# Patient Record
Sex: MALE | Race: WHITE | NOT HISPANIC OR LATINO
[De-identification: names, ages, dates, MRNs, and addresses within clinical notes are randomized per-mention and may not be internally consistent; named-entity substitution may affect disease eponyms.]

---

## 2018-12-07 PROBLEM — Z00.00 ENCOUNTER FOR PREVENTIVE HEALTH EXAMINATION: Status: ACTIVE | Noted: 2018-12-07

## 2018-12-12 ENCOUNTER — RX RENEWAL (OUTPATIENT)
Age: 63
End: 2018-12-12

## 2018-12-13 ENCOUNTER — RX RENEWAL (OUTPATIENT)
Age: 63
End: 2018-12-13

## 2019-10-02 ENCOUNTER — RECORD ABSTRACTING (OUTPATIENT)
Age: 64
End: 2019-10-02

## 2019-10-02 DIAGNOSIS — Z87.891 PERSONAL HISTORY OF NICOTINE DEPENDENCE: ICD-10-CM

## 2019-10-02 DIAGNOSIS — K52.9 NONINFECTIVE GASTROENTERITIS AND COLITIS, UNSPECIFIED: ICD-10-CM

## 2019-10-02 DIAGNOSIS — Z72.89 OTHER PROBLEMS RELATED TO LIFESTYLE: ICD-10-CM

## 2019-10-02 DIAGNOSIS — K62.5 HEMORRHAGE OF ANUS AND RECTUM: ICD-10-CM

## 2019-10-02 DIAGNOSIS — Z82.49 FAMILY HISTORY OF ISCHEMIC HEART DISEASE AND OTHER DISEASES OF THE CIRCULATORY SYSTEM: ICD-10-CM

## 2019-10-02 DIAGNOSIS — Z80.3 FAMILY HISTORY OF MALIGNANT NEOPLASM OF BREAST: ICD-10-CM

## 2019-10-02 DIAGNOSIS — Z98.890 OTHER SPECIFIED POSTPROCEDURAL STATES: ICD-10-CM

## 2019-10-02 RX ORDER — OMEGA-3/DHA/EPA/FISH OIL 300-1000MG
1000 CAPSULE ORAL
Refills: 0 | Status: ACTIVE | COMMUNITY

## 2019-10-02 RX ORDER — FLUTICASONE PROPIONATE 50 UG/1
SPRAY, METERED NASAL
Refills: 0 | Status: ACTIVE | COMMUNITY

## 2019-10-02 RX ORDER — AZELASTINE HYDROCHLORIDE 137 UG/1
137 SPRAY, METERED NASAL
Refills: 0 | Status: ACTIVE | COMMUNITY

## 2019-11-05 ENCOUNTER — APPOINTMENT (OUTPATIENT)
Dept: GASTROENTEROLOGY | Facility: CLINIC | Age: 64
End: 2019-11-05
Payer: MEDICARE

## 2019-11-05 VITALS
WEIGHT: 170 LBS | HEART RATE: 64 BPM | SYSTOLIC BLOOD PRESSURE: 128 MMHG | DIASTOLIC BLOOD PRESSURE: 76 MMHG | OXYGEN SATURATION: 97 % | HEIGHT: 70 IN | BODY MASS INDEX: 24.34 KG/M2

## 2019-11-05 PROCEDURE — 99214 OFFICE O/P EST MOD 30 MIN: CPT

## 2019-11-05 RX ORDER — OMEPRAZOLE 40 MG/1
40 CAPSULE, DELAYED RELEASE ORAL
Refills: 0 | Status: DISCONTINUED | COMMUNITY
End: 2019-11-05

## 2019-11-05 NOTE — HISTORY OF PRESENT ILLNESS
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\par with signif inflam in 2015..\par \par patients symptoms began at the age of about [age].\par \par symptoms began with rectal bleeding, approx march 2013.\par \par saw several other gis, I started following him in 2014..\par \par last colonoscopy;\par \par was in 2016, and patient was in remission, but there was a solitary granuloma on the cecal biopsies, otherwise the biopsies being unremarkable\par \par meanwhile, only time the patient received prednisone was early on in the process, and as he recalls, the prednisone was poorly tolerated at least at the higher doses.\par \par currently, patient has been on three balsalazide po bid..\par \par and he has whittled down his ROWAAS enemas to approx two per month.\par \par no sx, no diarrhea, no bleeding, feels very well

## 2019-11-05 NOTE — ASSESSMENT
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 Smoking history,none since about 20 years ago\par 4 Last colonoscopy somewhat over 3 years ago and the patient was in remission except for a granuloma of the cecum\par 5. I do feel that the patient can't even reduce his dose of   balsalazide to two x 750 mg, bid\par 6.  patient should not reduce his dose of rowasa enemas further, as i like this form of topical maintenance therapy\par 7.  had recent labs, and i would like to see his hg and hct [said to be low..or on the low side]\par 8.  no upper gi sx\par 9.  i recomm  a ua yearly..and this was done also\par 10.  flu shot has been obtained\par 11.  has had pneumonia...did he have two pneumonia, the second being the prevnar.\par 12.  here is also a shingles shot, and I recommend the shinrix be received\par \par \par meanwhile time for colonoscopy..\par \par it has been somewhat more than three years..\par this is for colon cancer surveillance, and with chronic ulcerative colitis, we need to do more frequent colonoscopy surveillance..\par \par there is no proof that being in remission can alleviate the need for follow up colonoscopies, although it is my intuition that good clinical control of ulcerative colitis does help protect against colon cancer\par \par AS WE OBTAIN INFORMED CONSENT FOR COLONOSCOPY, UPPER ENDOSCOPY [EGD], OR BOTH PROCEDURES TOGETHER;\par \par As with all procedures, there are risks of which the patient should be aware\par \par 1.  Anesthesia; deep sedation with Propofol;  there is a small risk of aspiration and pulmonary infection.  The Anesthesiologist meets with the patient the morning of the procedure to discuss in more detail\par \par 2.  risk of bleeding; from removal of a polyp, or rarely, from biopsies, 1 % or less\par \par 3.  risk of injury or perforation of the colon or upper GI tract; one in a thousand or less,  from removing a polyp or from advancing the instrument\par \par \par More than 50% of the face to face time was devoted to counseling and /or coordination of care.  THis coordination of care may have included reviewing other medical notes and reports, and communicating with other health professionals\par

## 2020-01-30 ENCOUNTER — APPOINTMENT (OUTPATIENT)
Dept: GASTROENTEROLOGY | Facility: HOSPITAL | Age: 65
End: 2020-01-30

## 2020-02-05 ENCOUNTER — RESULT REVIEW (OUTPATIENT)
Age: 65
End: 2020-02-05

## 2020-02-06 ENCOUNTER — APPOINTMENT (OUTPATIENT)
Dept: GASTROENTEROLOGY | Facility: HOSPITAL | Age: 65
End: 2020-02-06

## 2020-02-20 ENCOUNTER — RX RENEWAL (OUTPATIENT)
Age: 65
End: 2020-02-20

## 2020-06-19 DIAGNOSIS — K51.30 ULCERATIVE (CHRONIC) RECTOSIGMOIDITIS W/OUT COMPLICATIONS: ICD-10-CM

## 2020-10-17 ENCOUNTER — TRANSCRIPTION ENCOUNTER (OUTPATIENT)
Age: 65
End: 2020-10-17

## 2020-11-23 ENCOUNTER — APPOINTMENT (OUTPATIENT)
Dept: GASTROENTEROLOGY | Facility: CLINIC | Age: 65
End: 2020-11-23
Payer: MEDICARE

## 2020-11-23 VITALS
OXYGEN SATURATION: 98 % | SYSTOLIC BLOOD PRESSURE: 130 MMHG | TEMPERATURE: 96.7 F | DIASTOLIC BLOOD PRESSURE: 90 MMHG | BODY MASS INDEX: 28.88 KG/M2 | WEIGHT: 195 LBS | HEIGHT: 69 IN | HEART RATE: 68 BPM

## 2020-11-23 DIAGNOSIS — K51.518: ICD-10-CM

## 2020-11-23 PROCEDURE — 99214 OFFICE O/P EST MOD 30 MIN: CPT

## 2020-11-23 NOTE — HISTORY OF PRESENT ILLNESS
[FreeTextEntry1] : Erika is doing well\par \par Stays in remission\par no active symptoms\par had his last colonoscopy in feb 2020, and he was in remission at the time\par \par so this is what we would refer to as deep remission\par \par patient is very pleased that he can stay on COlazal 750 mg x2, bid and maintain his remission\par \par he has virtually tapered himself off rowasa enemas, down to approx one monthly

## 2020-11-23 NOTE — ASSESSMENT
[FreeTextEntry1] : 1.  Chronic Left SIded Ulcerative Colitis\par \par has stayed in remission since colonoscopy, and up to as well\par \par plan\par \par 1.  continue colonoscopies every two years\par \par 2.  stay on Colazal 750 mg x2 po bid\par \par 3.  patient is doing a good job on his medical fu and immunizations for various medical issues; pneumonia, Herpes ZOster, FLu, etc\par \par 4.  I have emphasized that his Ulcerative COlitis puts him in a higher risk category, for developing COvid complications, complicated course\par so it is extremely important for patient to get the Vaccine for Covid as soon as it is availabe to an older patient with significant co morbidity\par \par 5.i advise patients on mesalamine drugs, such as Colazal\par to have BUN and Creatinine and urine analysis every six months, because of a small chance of Allergic Nephritis..\par \par More than 50% of the face to face time was devoted to counseling and /or coordination of care.  THis coordination of care may have included reviewing other medical notes and reports, and communicating with other health professionals\par \par

## 2021-01-16 ENCOUNTER — TRANSCRIPTION ENCOUNTER (OUTPATIENT)
Age: 66
End: 2021-01-16

## 2021-01-18 ENCOUNTER — APPOINTMENT (OUTPATIENT)
Dept: DISASTER EMERGENCY | Facility: HOSPITAL | Age: 66
End: 2021-01-18

## 2021-10-26 ENCOUNTER — RX RENEWAL (OUTPATIENT)
Age: 66
End: 2021-10-26

## 2022-01-18 ENCOUNTER — APPOINTMENT (OUTPATIENT)
Dept: GASTROENTEROLOGY | Facility: CLINIC | Age: 67
End: 2022-01-18
Payer: MEDICARE

## 2022-01-18 VITALS
TEMPERATURE: 98.7 F | BODY MASS INDEX: 24.29 KG/M2 | HEIGHT: 69 IN | DIASTOLIC BLOOD PRESSURE: 64 MMHG | SYSTOLIC BLOOD PRESSURE: 136 MMHG | RESPIRATION RATE: 16 BRPM | WEIGHT: 164 LBS

## 2022-01-18 PROCEDURE — 99214 OFFICE O/P EST MOD 30 MIN: CPT

## 2022-01-18 NOTE — HISTORY OF PRESENT ILLNESS
[FreeTextEntry1] : in office visit\par patient has left sided Ulcerative Colitis\par \par current meds;\par \par balsalzide 750 mg x2\par po bid\par \par four tabs per day\par \par does intermittent fasting\par and five days a week at the gym\par \par has lost approx forty pounds of weight with this regimen\par \par no colitis sx..\par \par had bleeding as his most active symptoms\par but no more\par \par his last colonoscopy was feb 6th, 2020, and was normal\par \par no topical treatment needed\par \par one year ago, had Covid, was pretty ill with high temp\par \par got the monoclonal ab...\par as an out patient..[facility]

## 2022-01-18 NOTE — ASSESSMENT
[FreeTextEntry1] : 1.  patient is doing very well.\par \par 2.  no gi symptoms, Ulcerative Colitis, distal variant, is in remission\par \par 3.  on his last colonoscopy, which was on feb 6th, 2020, he was assymptomatic and his colonoscopy was negative for any inflamm, and his bx were negative for any inflamm or dysplasia\par \par 4.  i suggested at the time, that Erika undergo his next colonoscopy in feb 2023, one year from now\par \par he will continue his current meds, which seem to be working out very well..as he tended to have recurrent symptoms prior to the Balsalazide.\par \par Dr Rodriguez has done comprehensive blood work, and urine analysis, and they were both negative;\par it is my recommendation that a full blood count, and urine analysis, be done yearly when taking balsalazide, or in fact any of the mesalamine drugs..\par because of very infrequent effects on the kidneys\par \par ie allergic interstitial nephritis characterized by fever, malaise, a rash, blood and eosinophils in the urine from the kidneys...\par and generally but not always occurring early in the course of treatment\par \par More than 50% of the face to face time was devoted to counseling and /or coordination of care.  THis coordination of care may have included reviewing other medical notes and reports, and communicating with other health professionals\par

## 2022-04-30 ENCOUNTER — RX RENEWAL (OUTPATIENT)
Age: 67
End: 2022-04-30

## 2022-07-19 ENCOUNTER — RX RENEWAL (OUTPATIENT)
Age: 67
End: 2022-07-19

## 2022-07-19 RX ORDER — BALSALAZIDE DISODIUM 750 MG/1
750 CAPSULE ORAL 3 TIMES DAILY
Qty: 810 | Refills: 0 | Status: ACTIVE | COMMUNITY
Start: 2018-12-12 | End: 1900-01-01

## 2022-10-06 ENCOUNTER — APPOINTMENT (OUTPATIENT)
Dept: GASTROENTEROLOGY | Facility: CLINIC | Age: 67
End: 2022-10-06

## 2022-10-06 VITALS
SYSTOLIC BLOOD PRESSURE: 136 MMHG | HEIGHT: 69 IN | BODY MASS INDEX: 25.77 KG/M2 | WEIGHT: 174 LBS | HEART RATE: 64 BPM | DIASTOLIC BLOOD PRESSURE: 64 MMHG

## 2022-10-06 VITALS — TEMPERATURE: 97.5 F | HEART RATE: 64 BPM

## 2022-10-06 DIAGNOSIS — K51.90 ULCERATIVE COLITIS, UNSPECIFIED, W/OUT COMPLICATIONS: ICD-10-CM

## 2022-10-06 PROCEDURE — 99214 OFFICE O/P EST MOD 30 MIN: CPT

## 2022-10-06 RX ORDER — MESALAMINE 4 G/60ML
4 ENEMA RECTAL
Qty: 60 | Refills: 2 | Status: ACTIVE | COMMUNITY
Start: 2020-06-19

## 2022-10-06 RX ORDER — MESALAMINE 4 G/60ML
4 ENEMA RECTAL
Refills: 0 | Status: ACTIVE | COMMUNITY

## 2022-10-06 NOTE — HISTORY OF PRESENT ILLNESS
[FreeTextEntry1] : patient has chronic left sided ulcerative colitis, in rectosigmoid colon\par \par patient is doing extremely well\par \par no gi symptoms whatsoever\par \par his last colonoscopy was Feb 2020, and he was in deep remission, clinically, endoscopically, and on biopsies\par \par in fact, on a regiemen of Balsalazide 750 mgx2, bid, \par \par without topical treatment\par he has staid very well

## 2022-10-06 NOTE — ASSESSMENT
[FreeTextEntry1] : Erika is doing extremely well\par \par it is very exciting time for him, as he will be moving to [facility], and this is great, although I will be losing a really solid patient\par \par he has his records for the most part, but will let me know if anything is needed, and he plans to network to a new GI through his cousin, a Peds GI\par \par patient is up to date on his covid, and will be boostered with omicron\par and up to date with his SHirnrix\par his pneumonia shot\par \par he sees a dermatologist once a year\par \par he recently had his full physical exam\par \par and when it comes to his next colonoscopy;  i advised a fu colonoscopy for feb 2023...we talked about a colonoscopy before he leaves for [place]\[facility] i would advise however, that Erika defer until he sees his new GI as it is a better way for him to start off..\par \par More than 50% of the face to face time was devoted to counseling and /or coordination of care.  THis coordination of care may have included reviewing other medical notes and reports, and communicating with other health professionals\par